# Patient Record
Sex: FEMALE | Race: WHITE | HISPANIC OR LATINO | Employment: FULL TIME | URBAN - METROPOLITAN AREA
[De-identification: names, ages, dates, MRNs, and addresses within clinical notes are randomized per-mention and may not be internally consistent; named-entity substitution may affect disease eponyms.]

---

## 2017-08-11 ENCOUNTER — HOSPITAL ENCOUNTER (EMERGENCY)
Facility: HOSPITAL | Age: 37
Discharge: HOME | End: 2017-08-11
Attending: EMERGENCY MEDICINE
Payer: COMMERCIAL

## 2017-08-11 VITALS
BODY MASS INDEX: 27.6 KG/M2 | HEIGHT: 62 IN | DIASTOLIC BLOOD PRESSURE: 77 MMHG | HEART RATE: 69 BPM | SYSTOLIC BLOOD PRESSURE: 115 MMHG | OXYGEN SATURATION: 98 % | WEIGHT: 150 LBS | TEMPERATURE: 98 F | RESPIRATION RATE: 15 BRPM

## 2017-08-11 DIAGNOSIS — T14.90XA INJURY: ICD-10-CM

## 2017-08-11 DIAGNOSIS — M62.838 MUSCLE SPASM: ICD-10-CM
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
## 2017-08-11 LAB
B-HCG UR QL: NEGATIVE
CTP QC/QA: YES

## 2017-08-11 PROCEDURE — 25000003 PHARM REV CODE 250: Performed by: EMERGENCY MEDICINE

## 2017-08-11 PROCEDURE — 81025 URINE PREGNANCY TEST: CPT | Performed by: EMERGENCY MEDICINE

## 2017-08-11 PROCEDURE — 99284 EMERGENCY DEPT VISIT MOD MDM: CPT | Mod: 25

## 2017-08-11 RX ORDER — CYCLOBENZAPRINE HCL 10 MG
5 TABLET ORAL 3 TIMES DAILY PRN
Qty: 15 TABLET | Refills: 0 | Status: SHIPPED | OUTPATIENT
Start: 2017-08-11 | End: 2017-08-16

## 2017-08-11 RX ORDER — IBUPROFEN 600 MG/1
600 TABLET ORAL
Status: COMPLETED | OUTPATIENT
Start: 2017-08-11 | End: 2017-08-11

## 2017-08-11 RX ORDER — NAPROXEN 500 MG/1
500 TABLET ORAL 2 TIMES DAILY WITH MEALS
Qty: 14 TABLET | Refills: 0 | Status: SHIPPED | OUTPATIENT
Start: 2017-08-11 | End: 2023-03-03 | Stop reason: SDUPTHER

## 2017-08-11 RX ADMIN — IBUPROFEN 600 MG: 600 TABLET, FILM COATED ORAL at 10:08
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
## 2023-03-03 ENCOUNTER — HOSPITAL ENCOUNTER (EMERGENCY)
Facility: HOSPITAL | Age: 43
Discharge: HOME | End: 2023-03-03
Attending: EMERGENCY MEDICINE

## 2023-03-03 VITALS
DIASTOLIC BLOOD PRESSURE: 56 MMHG | OXYGEN SATURATION: 100 % | SYSTOLIC BLOOD PRESSURE: 118 MMHG | TEMPERATURE: 98 F | HEART RATE: 85 BPM | RESPIRATION RATE: 16 BRPM

## 2023-03-03 DIAGNOSIS — D23.5 DERMOID CYST OF SKIN OF BACK: Primary | ICD-10-CM

## 2023-03-03 PROCEDURE — 99283 EMERGENCY DEPT VISIT LOW MDM: CPT

## 2023-03-03 RX ORDER — NAPROXEN 500 MG/1
500 TABLET ORAL 2 TIMES DAILY PRN
Qty: 20 TABLET | Refills: 0 | Status: SHIPPED | OUTPATIENT
Start: 2023-03-03

## 2023-03-03 RX ORDER — SULFAMETHOXAZOLE AND TRIMETHOPRIM 800; 160 MG/1; MG/1
1 TABLET ORAL 2 TIMES DAILY
Qty: 14 TABLET | Refills: 0 | Status: SHIPPED | OUTPATIENT
Start: 2023-03-03 | End: 2023-03-10

## 2023-03-03 NOTE — ED PROVIDER NOTES
Encounter Date: 3/3/2023    SCRIBE #1 NOTE: I, David Baldwin, am scribing for, and in the presence of,  Pablo Comer MD. I have scribed the following portions of the note - Other sections scribed: HPI, ROS, Physical Exam.     History     Chief Complaint   Patient presents with    Abscess     Noted to middle upper back x 4 days      This is a 42 y.o. female who has no past medical history on file.     The patient presents to the Emergency Department with an abscess.   Patient reports an abscess to the mid back that she noticed a few days ago and has since grown in size.  Pt reports the area is painful to touch.    The history is provided by the patient. The history is limited by a language barrier. A  was used.       Review of patient's allergies indicates:  No Known Allergies  No past medical history on file.  No past surgical history on file.  No family history on file.  Social History     Tobacco Use    Smoking status: Never    Smokeless tobacco: Never     Review of Systems   Skin:         +abscess     Physical Exam     Initial Vitals [03/03/23 0710]   BP Pulse Resp Temp SpO2   (!) 118/56 85 16 98 °F (36.7 °C) 100 %      MAP       --         Physical Exam    Nursing note and vitals reviewed.  Constitutional: She appears well-developed and well-nourished. She is not diaphoretic. No distress.   HENT:   Head: Normocephalic and atraumatic.   Mouth/Throat: Oropharynx is clear and moist.   Eyes: Conjunctivae are normal.   Cardiovascular:  Normal rate, regular rhythm and intact distal pulses.           Pulmonary/Chest: No respiratory distress.   Musculoskeletal:         General: Normal range of motion.     Neurological: She is alert and oriented to person, place, and time.   Skin: Skin is warm and dry. Capillary refill takes less than 2 seconds. No rash noted. No erythema.   3 cm cyst to the mid thoracic back without overlying erythema. It appears fluid filled and fluctuant.  No induration or  purulence.  Cyst is tender to palpation.   Psychiatric: She has a normal mood and affect.       ED Course   Procedures  Labs Reviewed - No data to display       Imaging Results    None          Medications - No data to display           Scribe Attestation:   Scribe #1: I performed the above scribed service and the documentation accurately describes the services I performed. I attest to the accuracy of the note.      ED Course as of 03/03/23 1143   Fri Mar 03, 2023   0739 I, Dr. Pablo Comer, personally performed the services described in this documentation.   All medical record entries made by the scribe were at my direction and in my presence.   I have reviewed the chart and agree that the record is accurate and complete.   Pablo Comer MD.    [NP]   6406 This is an emergent evaluation of a 42 y.o.female patient with presentation of cyst on her back, painful.  Per my exam, there is no overlying erythema 7 infection at this time.  This time.  Will treat conservatively, place patient on NSAIDs, apply ice as needed, wrote for antibiotics should patient's cyst start to appear infected.  Patient referred to general surgery for consult for possible cyst removal.  Given return precautions for worsening symptoms requiring possible I&D in the ED. Pt expressed understanding. [NP]      ED Course User Index  [NP] Pablo Comer MD                 Clinical Impression:   Final diagnoses:  [D23.5] Dermoid cyst of skin of back (Primary)        ED Disposition Condition    Discharge Stable          ED Prescriptions    None       Follow-up Information    None          Pablo Comer MD  03/03/23 0021

## 2023-03-03 NOTE — DISCHARGE INSTRUCTIONS
aplique hielo en el área para mayor comodidad.    comience a irving antibióticos si el quiste comienza a ponerse cedeno, caliente y se vuelve aún más doloroso.    lynda un seguimiento con el cirujano según lo referido, ya que pueden realizar shannon cirugía para extirpar el quiste.

## 2023-03-07 ENCOUNTER — OFFICE VISIT (OUTPATIENT)
Dept: SURGERY | Facility: CLINIC | Age: 43
End: 2023-03-07

## 2023-03-07 VITALS
SYSTOLIC BLOOD PRESSURE: 118 MMHG | WEIGHT: 169 LBS | DIASTOLIC BLOOD PRESSURE: 56 MMHG | HEART RATE: 85 BPM | BODY MASS INDEX: 31.1 KG/M2 | HEIGHT: 62 IN

## 2023-03-07 DIAGNOSIS — D23.5 DERMOID CYST OF SKIN OF BACK: ICD-10-CM

## 2023-03-07 PROCEDURE — 99213 OFFICE O/P EST LOW 20 MIN: CPT | Mod: PBBFAC,PO | Performed by: SURGERY

## 2023-03-07 PROCEDURE — 99243 OFF/OP CNSLTJ NEW/EST LOW 30: CPT | Mod: S$PBB,25,, | Performed by: SURGERY

## 2023-03-07 PROCEDURE — 99999 PR PBB SHADOW E&M-EST. PATIENT-LVL III: CPT | Mod: PBBFAC,,, | Performed by: SURGERY

## 2023-03-07 PROCEDURE — 10061 I&D ABSCESS COMP/MULTIPLE: CPT | Mod: S$PBB,,, | Performed by: SURGERY

## 2023-03-07 PROCEDURE — 10061 PR DRAIN SKIN ABSCESS COMPLIC: ICD-10-PCS | Mod: S$PBB,,, | Performed by: SURGERY

## 2023-03-07 PROCEDURE — 99999 PR PBB SHADOW E&M-EST. PATIENT-LVL III: ICD-10-PCS | Mod: PBBFAC,,, | Performed by: SURGERY

## 2023-03-07 PROCEDURE — 99243 PR OFFICE CONSULTATION,LEVEL III: ICD-10-PCS | Mod: S$PBB,25,, | Performed by: SURGERY

## 2023-03-07 PROCEDURE — 10061 I&D ABSCESS COMP/MULTIPLE: CPT | Mod: PBBFAC,PO | Performed by: SURGERY

## 2023-03-07 NOTE — PROGRESS NOTES
[facility]  OUTPATIENT H&P    REASON FOR VISIT/CC: Back cyst    HPI: Aarbella Moss is a 42 y.o. female with recent ER visit for back cyst.  F/U given to our office.  Pt has noted increased pain at site.    I have reviewed the patient's chart including prior progress notes, procedures and testing.     ROS:   Review of Systems   All other systems reviewed and are negative.    PROBLEM LIST:  There is no problem list on file for this patient.        HISTORY  History reviewed. No pertinent past medical history.    History reviewed. No pertinent surgical history.    Social History     Tobacco Use    Smoking status: Never    Smokeless tobacco: Never       History reviewed. No pertinent family history.      MEDS:  Current Outpatient Medications on File Prior to Visit   Medication Sig Dispense Refill    naproxen (NAPROSYN) 500 MG tablet Take 1 tablet (500 mg total) by mouth 2 (two) times daily as needed (pain). 20 tablet 0    sulfamethoxazole-trimethoprim 800-160mg (BACTRIM DS) 800-160 mg Tab Take 1 tablet by mouth 2 (two) times daily. for 7 days 14 tablet 0     No current facility-administered medications on file prior to visit.       ALLERGIES:  Review of patient's allergies indicates:  No Known Allergies      VITALS:  Vitals:    03/07/23 1320   BP: (!) 118/56   Pulse: 85         PHYSICAL EXAM:  Physical Exam  Constitutional:       Appearance: Normal appearance.   Pulmonary:      Effort: Pulmonary effort is normal.   Skin:     Comments: 3 cm back inclusion cyst   Psychiatric:         Mood and Affect: Mood normal.         Behavior: Behavior normal.         LABS:  No results found for: WBC, RBC, HGB, HCT, PLT  No results found for: GLU, NA, K, CL, CO2, BUN, CREATININE, CALCIUM  No results found for: ALT, AST, GGT, ALKPHOS, BILITOT  No results found for: MG, PHOS    STUDIES:  N/A images and reports were personally reviewed.        ASSESSMENT & PLAN:  42 y.o. female with suspected infected sebaceous cyst.   Will I and D in office.  Risks, benefits, and alternatives to the procedure were explained to the patient in detail.  All questions were answered and the patient has requested the procedure be done.  Informed consent was obtained.        Issa Talbert M.D., F.A.C.S.  [facility] and General Surgery  [facility]

## 2023-03-07 NOTE — PROGRESS NOTES
PATIENT: Arabella Moss    MRN: [id number]    DATE OF PROCEDURE: 03/07/2023    PREOPERATIVE DIAGNOSIS: Abscess of inclusion cyst of back    POSTOPERATIVE DIAGNOSIS: same    PROCEDURE: Complex Incision and Drainage of Infected Inclusion Cyst    SURGEON: Issa Talbert M.D.    ANESTHESIA: Local    ESTIMATED BLOOD LOSS: minimal    SPECIMEN: none    COMPLICATIONS: None    PROCEDURE IN DETAIL:  After procedural consent was obtained, the area was prepped and draped in a standard sterile fashion.  Local anesthetic was administered.  An incision was made through the skin and into the abscess cavity.  Purulent fluid was expressed along with cyst contents and the cavity was irrigated out with sterile saline after using blunt dissection to break up loculations and remove some of the cyst wall.  The abscess cavity was not packed.  A sterile dressing was applied.  The patient tolerated the procedure without difficulty or complication.      Issa Talbert M.D., F.A.C.S.  [facility] and General Surgery  [facility]

## 2023-08-27 ENCOUNTER — HOSPITAL ENCOUNTER (EMERGENCY)
Facility: HOSPITAL | Age: 43
Discharge: HOME | End: 2023-08-27
Attending: STUDENT IN AN ORGANIZED HEALTH CARE EDUCATION/TRAINING PROGRAM

## 2023-08-27 VITALS
TEMPERATURE: 98 F | RESPIRATION RATE: 18 BRPM | SYSTOLIC BLOOD PRESSURE: 119 MMHG | HEART RATE: 89 BPM | OXYGEN SATURATION: 100 % | DIASTOLIC BLOOD PRESSURE: 78 MMHG

## 2023-08-27 DIAGNOSIS — G44.209 TENSION HEADACHE: Primary | ICD-10-CM

## 2023-08-27 LAB
ALBUMIN SERPL BCP-MCNC: 4 G/DL (ref 3.5–5.2)
ALP SERPL-CCNC: 87 U/L (ref 55–135)
ALT SERPL W/O P-5'-P-CCNC: 23 U/L (ref 10–44)
ANION GAP SERPL CALC-SCNC: 10 MMOL/L (ref 8–16)
AST SERPL-CCNC: 23 U/L (ref 10–40)
B-HCG UR QL: NEGATIVE
BASOPHILS # BLD AUTO: 0.04 K/UL (ref 0–0.2)
BASOPHILS NFR BLD: 0.6 % (ref 0–1.9)
BILIRUB SERPL-MCNC: 0.3 MG/DL (ref 0.1–1)
BILIRUB UR QL STRIP: NEGATIVE
BUN SERPL-MCNC: 10 MG/DL (ref 6–20)
CALCIUM SERPL-MCNC: 8.9 MG/DL (ref 8.7–10.5)
CHLORIDE SERPL-SCNC: 109 MMOL/L (ref 95–110)
CLARITY UR: CLEAR
CO2 SERPL-SCNC: 20 MMOL/L (ref 23–29)
COLOR UR: COLORLESS
CREAT SERPL-MCNC: 0.7 MG/DL (ref 0.5–1.4)
CTP QC/QA: YES
CTP QC/QA: YES
DIFFERENTIAL METHOD: NORMAL
EOSINOPHIL # BLD AUTO: 0.3 K/UL (ref 0–0.5)
EOSINOPHIL NFR BLD: 4.2 % (ref 0–8)
ERYTHROCYTE [DISTWIDTH] IN BLOOD BY AUTOMATED COUNT: 12.4 % (ref 11.5–14.5)
EST. GFR  (NO RACE VARIABLE): >60 ML/MIN/1.73 M^2
GLUCOSE SERPL-MCNC: 99 MG/DL (ref 70–110)
GLUCOSE UR QL STRIP: NEGATIVE
HCT VFR BLD AUTO: 40.7 % (ref 37–48.5)
HGB BLD-MCNC: 14.2 G/DL (ref 12–16)
HGB UR QL STRIP: ABNORMAL
IMM GRANULOCYTES # BLD AUTO: 0.01 K/UL (ref 0–0.04)
IMM GRANULOCYTES NFR BLD AUTO: 0.1 % (ref 0–0.5)
KETONES UR QL STRIP: NEGATIVE
LEUKOCYTE ESTERASE UR QL STRIP: NEGATIVE
LYMPHOCYTES # BLD AUTO: 2 K/UL (ref 1–4.8)
LYMPHOCYTES NFR BLD: 29.3 % (ref 18–48)
MAGNESIUM SERPL-MCNC: 2.2 MG/DL (ref 1.6–2.6)
MCH RBC QN AUTO: 30 PG (ref 27–31)
MCHC RBC AUTO-ENTMCNC: 34.9 G/DL (ref 32–36)
MCV RBC AUTO: 86 FL (ref 82–98)
MICROSCOPIC COMMENT: NORMAL
MONOCYTES # BLD AUTO: 0.5 K/UL (ref 0.3–1)
MONOCYTES NFR BLD: 7.6 % (ref 4–15)
NEUTROPHILS # BLD AUTO: 3.9 K/UL (ref 1.8–7.7)
NEUTROPHILS NFR BLD: 58.2 % (ref 38–73)
NITRITE UR QL STRIP: NEGATIVE
NRBC BLD-RTO: 0 /100 WBC
PH UR STRIP: 6 [PH] (ref 5–8)
PLATELET # BLD AUTO: 237 K/UL (ref 150–450)
PMV BLD AUTO: 10.7 FL (ref 9.2–12.9)
POTASSIUM SERPL-SCNC: 3.9 MMOL/L (ref 3.5–5.1)
PROT SERPL-MCNC: 7.7 G/DL (ref 6–8.4)
PROT UR QL STRIP: NEGATIVE
RBC # BLD AUTO: 4.73 M/UL (ref 4–5.4)
RBC #/AREA URNS HPF: 0 /HPF (ref 0–4)
SARS-COV-2 RDRP RESP QL NAA+PROBE: NEGATIVE
SODIUM SERPL-SCNC: 139 MMOL/L (ref 136–145)
SP GR UR STRIP: 1.01 (ref 1–1.03)
SQUAMOUS #/AREA URNS HPF: 4 /HPF
URN SPEC COLLECT METH UR: ABNORMAL
UROBILINOGEN UR STRIP-ACNC: NEGATIVE EU/DL
WBC # BLD AUTO: 6.68 K/UL (ref 3.9–12.7)
WBC #/AREA URNS HPF: 4 /HPF (ref 0–5)

## 2023-08-27 PROCEDURE — 81025 URINE PREGNANCY TEST: CPT | Performed by: STUDENT IN AN ORGANIZED HEALTH CARE EDUCATION/TRAINING PROGRAM

## 2023-08-27 PROCEDURE — 85025 COMPLETE CBC W/AUTO DIFF WBC: CPT | Performed by: STUDENT IN AN ORGANIZED HEALTH CARE EDUCATION/TRAINING PROGRAM

## 2023-08-27 PROCEDURE — 25000003 PHARM REV CODE 250: Performed by: STUDENT IN AN ORGANIZED HEALTH CARE EDUCATION/TRAINING PROGRAM

## 2023-08-27 PROCEDURE — 87635 SARS-COV-2 COVID-19 AMP PRB: CPT | Performed by: STUDENT IN AN ORGANIZED HEALTH CARE EDUCATION/TRAINING PROGRAM

## 2023-08-27 PROCEDURE — 83735 ASSAY OF MAGNESIUM: CPT | Performed by: STUDENT IN AN ORGANIZED HEALTH CARE EDUCATION/TRAINING PROGRAM

## 2023-08-27 PROCEDURE — 63600175 PHARM REV CODE 636 W HCPCS: Performed by: STUDENT IN AN ORGANIZED HEALTH CARE EDUCATION/TRAINING PROGRAM

## 2023-08-27 PROCEDURE — 81000 URINALYSIS NONAUTO W/SCOPE: CPT | Performed by: STUDENT IN AN ORGANIZED HEALTH CARE EDUCATION/TRAINING PROGRAM

## 2023-08-27 PROCEDURE — 96361 HYDRATE IV INFUSION ADD-ON: CPT

## 2023-08-27 PROCEDURE — 80053 COMPREHEN METABOLIC PANEL: CPT | Performed by: STUDENT IN AN ORGANIZED HEALTH CARE EDUCATION/TRAINING PROGRAM

## 2023-08-27 PROCEDURE — 99285 EMERGENCY DEPT VISIT HI MDM: CPT | Mod: 25

## 2023-08-27 PROCEDURE — 96374 THER/PROPH/DIAG INJ IV PUSH: CPT

## 2023-08-27 RX ORDER — DIPHENHYDRAMINE HCL 25 MG
25 CAPSULE ORAL
Status: COMPLETED | OUTPATIENT
Start: 2023-08-27 | End: 2023-08-27

## 2023-08-27 RX ORDER — PROCHLORPERAZINE EDISYLATE 5 MG/ML
10 INJECTION INTRAMUSCULAR; INTRAVENOUS
Status: COMPLETED | OUTPATIENT
Start: 2023-08-27 | End: 2023-08-27

## 2023-08-27 RX ORDER — BUTALBITAL, ACETAMINOPHEN AND CAFFEINE 50; 325; 40 MG/1; MG/1; MG/1
1 TABLET ORAL
Status: COMPLETED | OUTPATIENT
Start: 2023-08-27 | End: 2023-08-27

## 2023-08-27 RX ORDER — BUTALBITAL, ACETAMINOPHEN AND CAFFEINE 50; 325; 40 MG/1; MG/1; MG/1
1 TABLET ORAL EVERY 4 HOURS PRN
Qty: 30 TABLET | Refills: 0 | Status: SHIPPED | OUTPATIENT
Start: 2023-08-27

## 2023-08-27 RX ADMIN — DIPHENHYDRAMINE HYDROCHLORIDE 25 MG: 25 CAPSULE ORAL at 03:08

## 2023-08-27 RX ADMIN — BUTALBITAL, ACETAMINOPHEN, AND CAFFEINE 1 TABLET: 50; 325; 40 TABLET ORAL at 03:08

## 2023-08-27 RX ADMIN — SODIUM CHLORIDE 1000 ML: 9 INJECTION, SOLUTION INTRAVENOUS at 03:08

## 2023-08-27 RX ADMIN — PROCHLORPERAZINE EDISYLATE 10 MG: 5 INJECTION INTRAMUSCULAR; INTRAVENOUS at 03:08

## 2023-08-27 NOTE — DISCHARGE INSTRUCTIONS
Thank you for coming to our Emergency Department today. It is important to remember that some problems are difficult to diagnose and may not be found during your first visit. Be sure to follow up with your primary care doctor and review any labs/imaging that was performed with them. If you do not have a primary care doctor, you may contact the one listed on your discharge paperwork or you may also call the [facility] Appointment Desk at [phone] to schedule an appointment with one.     All medications may potentially have side effects and it is impossible to predict which medications may give you side effects. If you feel that you are having a negative effect of any medication you should immediately stop taking them and seek medical attention.    Return to the ER with any questions/concerns, new/concerning symptoms, worsening or failure to improve. Do not drive or make any important decisions for 24 hours if you have received any pain medications, sedatives or mood altering drugs during your ER visit.

## 2023-08-27 NOTE — ED PROVIDER NOTES
Encounter Date: 8/27/2023       History     Chief Complaint   Patient presents with    Headache     Caden Hodge [id number]      Headache that started around 6 pm yesterday. Has been constant. Radiates down back of neck. Denies N/V. +photophobia  Tylenol taken last yesterday.      42 y.o. female who  has no past medical history presents to emergency department with headache for the past 2 days.  Patient reports symptoms began 2 days ago and has been progressively worsening.  She reports pain starts in bilateral head and radiates down her neck.  She describes the pain as squeezing in nature.  She reports having mild photophobia but denies any phonophobia.  Denies any fevers or chills.  She took Tylenol yesterday with minimal relief.  Furthermore she reports having numbness to the left side of her face.  She states when she was in her teenage years and [facility] she is to have similar headaches with numbness in the same region.  Denies any gait instability.  Denies any focal weakness.  Denies any recent trauma.      The history is provided by the patient. The history is limited by a language barrier. A  was used ([id number]).     Review of patient's allergies indicates:  No Known Allergies  No past medical history on file.  No past surgical history on file.  No family history on file.  Social History     Tobacco Use    Smoking status: Never    Smokeless tobacco: Never     Review of Systems   Constitutional:  Negative for chills and fever.   HENT:  Negative for congestion and rhinorrhea.    Eyes:  Positive for photophobia. Negative for pain and visual disturbance.   Respiratory:  Negative for cough and shortness of breath.    Cardiovascular:  Negative for chest pain and leg swelling.   Gastrointestinal:  Negative for abdominal pain, nausea and vomiting.   Endocrine: Negative for polyuria.   Genitourinary:  Negative for dysuria and hematuria.   Musculoskeletal:  Negative for gait problem and neck pain.    Skin:  Negative for rash.   Allergic/Immunologic: Negative for immunocompromised state.   Neurological:  Positive for numbness. Negative for weakness and headaches.       Physical Exam     Initial Vitals [08/27/23 0248]   BP Pulse Resp Temp SpO2   (!) 143/80 89 18 98.4 °F (36.9 °C) 100 %      MAP       --         Physical Exam    Nursing note and vitals reviewed.  Constitutional: She is not diaphoretic. No distress.   HENT:   Head: Normocephalic and atraumatic.   Eyes: Conjunctivae and EOM are normal. Pupils are equal, round, and reactive to light.   Neck: No Brudzinski's sign and no Kernig's sign noted.   Normal range of motion.  Cardiovascular:  Regular rhythm.           Pulmonary/Chest: Breath sounds normal. No respiratory distress.   Abdominal: Abdomen is soft. Bowel sounds are normal. She exhibits no distension. There is no abdominal tenderness. There is no rebound and no guarding.   Musculoskeletal:         General: No tenderness. Normal range of motion.      Cervical back: Normal range of motion.     Lymphadenopathy:     She has no cervical adenopathy.   Neurological: She is alert.   Skin: Skin is warm. Capillary refill takes less than 2 seconds.   Psychiatric: She has a normal mood and affect. Her behavior is normal.         ED Course   Procedures  Labs Reviewed   COMPREHENSIVE METABOLIC PANEL - Abnormal; Notable for the following components:       Result Value    CO2 20 (*)     All other components within normal limits   URINALYSIS, REFLEX TO URINE CULTURE - Abnormal; Notable for the following components:    Color, UA Colorless (*)     Occult Blood UA 1+ (*)     All other components within normal limits    Narrative:     Specimen Source->Urine   CBC W/ AUTO DIFFERENTIAL   MAGNESIUM   URINALYSIS MICROSCOPIC    Narrative:     Specimen Source->Urine   POCT URINE PREGNANCY   SARS-COV-2 RDRP GENE          Imaging Results              CT Head Without Contrast (Final result)  Result time 08/27/23 04:16:41       Final result by Lew Lewis MD (08/27/23 04:16:41)                   Impression:      There is no evidence for acute intracranial process.    Paranasal sinus disease as above.      Electronically signed by: Lew Lewis  Date:    08/27/2023  Time:    04:16               Narrative:    EXAMINATION:  CT HEAD WITHOUT CONTRAST    CLINICAL HISTORY:  Headache, sudden, severe;    TECHNIQUE:  Low dose axial images were obtained through the head.  Coronal and sagittal reformations were also performed. Contrast was not administered.    COMPARISON:  None.    FINDINGS:  The ventricular system and sulcal pattern appear appropriate.  Mild chronic appearing white matter change noted.  There is no evidence for intracranial mass, mass effect or midline shift.  There is no evidence for acute intracranial hemorrhage.    The visualized orbits appear intact.  The mastoid air cells appear well aerated.  There is a small air-fluid level of the left sphenoid sinus, appearing viscus.  There is minimal paranasal sinus mucosal thickening otherwise noted.  The osseous structures appear intact.                                       Medications   butalbital-acetaminophen-caffeine -40 mg per tablet 1 tablet (1 tablet Oral Given 8/27/23 0350)   sodium chloride 0.9% bolus 1,000 mL 1,000 mL (0 mLs Intravenous Stopped 8/27/23 1398)   prochlorperazine injection Soln 10 mg (10 mg Intravenous Given 8/27/23 0351)   diphenhydrAMINE capsule 25 mg (25 mg Oral Given 8/27/23 0351)     Medical Decision Making    Initial Assessment:    42 y.o. female who  has no past medical history presents to emergency department with 2 days of headache.  Patient in no acute distress decreased sensation to touch in the left upper lower face no other focal neurological deficits.  No meningeal signs on exam.  Vitals within normal limits.    Vitals signs and physical exam as above.       Differential diagnosis includes migraine versus tension type headache. No  headache red flags. Neurologic exam without evidence of meningismus, focal neurologic findings. Presentation not consistent with acute intracranial bleed to include SAH (lack of risk factors, headache history). Presentation not consistent with acute CNS infection to include meningitis or brain abscess, Temporal arteritis unlikely, as is acute angle closure glaucoma given history and physical findings. Presentation not consistent with other acute, emergent causes of headache at this time.     Interventions and Plan:    Labs: CMP CBC mg  Imaging: CT head  Medications: IVF, migraine cocktail         Amount and/or Complexity of Data Reviewed  Labs: ordered.  Radiology: ordered. Decision-making details documented in ED Course.    Risk  OTC drugs.  Prescription drug management.               ED Course as of 08/27/23 1720   Sun Aug 27, 2023   0424 CT Head Without Contrast  No acute intracranial process  [AS]   0425 CBC without significant leukocytosis, anemia (at baseline), or platelet abnormalities.  Chem 14 negative for hypo-or hyper natremia, kalemia , chloridemia, or other electrolyte abnormalities; BUN and creatinine (at baseline), ALT and AST were within normal limits indicating normal liver function.   [AS]   0437 Pt is currently stable for discharge.   I see no indication of an emergent process beyond that addressed during our encounter but have duly counseled the patient/family regarding the need for prompt follow-up as well as the indications that should prompt immediate return to the emergency room should new or worrisome developments occur. I discussed the ED work up and diagnostic findings with the patient. The patient/family has been provided with verbal and printed direction regarding our final diagnosis(es) as well as instructions regarding use of OTC and/or Rx medications intended to manage the patient's aforementioned conditions. The patient/family verbalized an understanding. The patient/family is asked  if there are any questions or concerns. We discuss the case, until all issues are addressed to the patient/family's satisfaction. Patient/family understands and is agreeable to the plan.  [AS]      ED Course User Index  [AS] Adeline Castle MD                  DISCLAIMER: This note was prepared with [employer] voice recognition transcription software. Garbled syntax, mangled pronouns, and other bizarre constructions may be attributed to that software system.   Clinical Impression:   Final diagnoses:  [G44.209] Tension headache (Primary)        ED Disposition Condition    Discharge Stable          ED Prescriptions       Medication Sig Dispense Start Date End Date Auth. Provider    butalbital-acetaminophen-caffeine -40 mg (FIORICET, ESGIC) -40 mg per tablet Take 1 tablet by mouth every 4 (four) hours as needed for Pain or Headaches. 30 tablet 8/27/2023 -- Adeline Castle MD          Follow-up Information       Follow up With Specialties Details Why Contact [facility] - Emergency Dept Emergency Medicine  If symptoms worsen [address] [address] [phone]    primary care doctor  Schedule an appointment as soon as possible for a visit  for reassesment              Adeline Castle MD  08/27/23 2480

## 2024-09-27 ENCOUNTER — HOSPITAL ENCOUNTER (EMERGENCY)
Facility: HOSPITAL | Age: 44
Discharge: HOME | End: 2024-09-27
Attending: EMERGENCY MEDICINE

## 2024-09-27 VITALS
BODY MASS INDEX: 29.69 KG/M2 | WEIGHT: 184.75 LBS | TEMPERATURE: 98 F | OXYGEN SATURATION: 98 % | HEIGHT: 66 IN | SYSTOLIC BLOOD PRESSURE: 119 MMHG | DIASTOLIC BLOOD PRESSURE: 72 MMHG | RESPIRATION RATE: 18 BRPM | HEART RATE: 96 BPM

## 2024-09-27 DIAGNOSIS — L29.9 ITCHING: ICD-10-CM

## 2024-09-27 DIAGNOSIS — G44.209 TENSION HEADACHE: Primary | ICD-10-CM

## 2024-09-27 LAB
B-HCG UR QL: NEGATIVE
CTP QC/QA: YES
INFLUENZA A, MOLECULAR: NEGATIVE
INFLUENZA B, MOLECULAR: NEGATIVE
SARS-COV-2 RDRP RESP QL NAA+PROBE: NEGATIVE
SPECIMEN SOURCE: NORMAL

## 2024-09-27 PROCEDURE — 87502 INFLUENZA DNA AMP PROBE: CPT

## 2024-09-27 PROCEDURE — 25000003 PHARM REV CODE 250

## 2024-09-27 PROCEDURE — 81025 URINE PREGNANCY TEST: CPT

## 2024-09-27 PROCEDURE — 99283 EMERGENCY DEPT VISIT LOW MDM: CPT

## 2024-09-27 PROCEDURE — U0002 COVID-19 LAB TEST NON-CDC: HCPCS

## 2024-09-27 RX ORDER — BUTALBITAL, ACETAMINOPHEN AND CAFFEINE 50; 325; 40 MG/1; MG/1; MG/1
1 TABLET ORAL
Status: COMPLETED | OUTPATIENT
Start: 2024-09-27 | End: 2024-09-27

## 2024-09-27 RX ORDER — CETIRIZINE HYDROCHLORIDE 10 MG/1
10 TABLET ORAL DAILY
Qty: 30 TABLET | Refills: 0 | Status: SHIPPED | OUTPATIENT
Start: 2024-09-27 | End: 2025-09-27

## 2024-09-27 RX ORDER — DIPHENHYDRAMINE HCL 25 MG
25 CAPSULE ORAL EVERY 6 HOURS PRN
Qty: 20 CAPSULE | Refills: 0 | Status: SHIPPED | OUTPATIENT
Start: 2024-09-27

## 2024-09-27 RX ORDER — CETIRIZINE HYDROCHLORIDE 10 MG/1
10 TABLET ORAL
Status: COMPLETED | OUTPATIENT
Start: 2024-09-27 | End: 2024-09-27

## 2024-09-27 RX ADMIN — BUTALBITAL, ACETAMINOPHEN, AND CAFFEINE 1 TABLET: 325; 50; 40 TABLET ORAL at 10:09

## 2024-09-27 RX ADMIN — CETIRIZINE HYDROCHLORIDE 10 MG: 10 TABLET, FILM COATED ORAL at 10:09

## 2024-09-27 NOTE — ED NOTES
Pt reports to ED with c/o generalized body aches, itchiness, and headache x2 days. Patient was recently seen at the ED and prescribed vistaril and prednisone. Ambulatory with steady gait, NAD noted.      Adult Physical Assessment  LOC: 43 y.o. female verified via two identifiers.  The patient is awake, alert & oriented to person, place & time. No acute distress noted at this time, pt is speaking appropriately at this time.    APPEARANCE: Patient resting comfortably and appears to be in no acute distress at this time. Patient is clean and well groomed, patient's clothing is properly fastened.    SKIN:The skin is warm, dry & intact. Color consistent with ethnicity, patient has normal skin turgor and moist mucus membranes, skin intact, no breakdown or brusing noted. Patient complaining of itchiness.    MUSCULOSKELETAL: Patient moving all extremities well, no obvious swelling or deformities noted.    RESPIRATORY: Airway is open and patent, respirations are spontaneous, even, and non-labored patient has a normal effort and rate, no accessory muscle use noted.       CARDIAC: Patient has a normal rate and rhythm, no periphreal edema noted in any extremity, capillary refill < 3 seconds in all extremities.    ABDOMEN: Soft and non tender to palpation, no abdominal distention noted.     NEUROLOGIC: Sensation is intact. Eyes open spontaneously, behavior appropriate to situation, follows commands. Speech is clear and appropriate. Facial expression symmetrical, bilateral hand grasp equal and even. No bilateral lower extremity edema.

## 2024-09-27 NOTE — ED PROVIDER NOTES
"Encounter Date: 9/27/2024       History     Chief Complaint   Patient presents with    Multiple complaints     Patient presents to the ED complaining of generalized body aches, itchiness, and headache x2 days. Patient was seen at the ED and prescribed prednisone and vistaril. Patient ambulatory to triage, NAD noted.     43-year-old female with past medical history of GERD presents to the ED with multiple complaints.  Patient notes generalized body aches, intermittent headaches x 2 days.  Patient denies vision changes, neck rigidity or irritation.  Has been using Tylenol with minimal improvement of her symptoms.  Denies worst headache of her life. Of note, patient was seen in the ED for " rash". Upon chart review, no notes available regarding that visit. She was sent home with prednisone and vistaril. States she is still itchy, describes it as generalized. Has been using Rx with some relief of her symptoms. Patient denies use of new medications, lotions, soaps, detergents.  Deny recent travels.  No fever, nausea, vomiting, chest pain, shortness of breath, abdominal pain.  No other acute complaints today.    The history is provided by the patient. No  was used.     Review of patient's allergies indicates:  No Known Allergies  No past medical history on file.  No past surgical history on file.  No family history on file.  Social History     Tobacco Use    Smoking status: Never    Smokeless tobacco: Never     Review of Systems   Constitutional:  Negative for chills and fever.   HENT:  Positive for congestion.    Eyes:  Negative for photophobia and visual disturbance.   Respiratory:  Negative for shortness of breath.    Cardiovascular:  Negative for chest pain.   Gastrointestinal:  Negative for abdominal distention, abdominal pain, rectal pain and vomiting.   Musculoskeletal:  Positive for myalgias.   Skin:  Negative for rash.   Neurological:  Positive for headaches.       Physical Exam     Initial " Vitals [09/27/24 1034]   BP Pulse Resp Temp SpO2   119/72 96 18 98.2 °F (36.8 °C) 98 %      MAP       --         Physical Exam    Vitals reviewed.  Constitutional: She appears well-developed and well-nourished. She is not diaphoretic. No distress.   HENT:   Head: Normocephalic and atraumatic.   Right Ear: External ear normal.   Left Ear: External ear normal.   Mouth/Throat: Oropharynx is clear and moist.   Eyes: EOM are normal.   Neck: Neck supple.   Normal range of motion.  Cardiovascular:  Normal rate and normal heart sounds.           Pulmonary/Chest: Breath sounds normal. No respiratory distress. She has no wheezes.   Abdominal: Abdomen is soft. Bowel sounds are normal. She exhibits no distension. There is no abdominal tenderness.   Musculoskeletal:         General: Normal range of motion.      Cervical back: Normal range of motion and neck supple.     Neurological: She is alert and oriented to person, place, and time. GCS score is 15. GCS eye subscore is 4. GCS verbal subscore is 5. GCS motor subscore is 6.   Skin: Skin is warm. Capillary refill takes less than 2 seconds.   On exam, I do not appreciate visible rash. No evidence or urticaria, lesions.    Psychiatric: She has a normal mood and affect. Her behavior is normal. Judgment and thought content normal.         ED Course   Procedures  Labs Reviewed   INFLUENZA A & B BY MOLECULAR       Result Value    Influenza A, Molecular Negative      Influenza B, Molecular Negative      Flu A & B Source Nasal swab     SARS-COV-2 RNA AMPLIFICATION, QUAL    SARS-CoV-2 RNA, Amplification, Qual Negative     POCT URINE PREGNANCY    POC Preg Test, Ur Negative       Acceptable Yes            Imaging Results    None          Medications   butalbital-acetaminophen-caffeine -40 mg per tablet 1 tablet (1 tablet Oral Given 9/27/24 1051)   cetirizine tablet 10 mg (10 mg Oral Given 9/27/24 1052)     Medical Decision Making  Differential Diagnosis includes, but  is not limited to:    Necrotizing fasciitis, erythema multiforme, Darnell-Alex syndrome, toxic epidermal necrolysis, DIC, cellulitis, Staph scalded skin syndrome, toxic shock syndrome, secondary syphilis, abscess, osteomyelitis, septic joint, MRSA, DVT, superficial thrombophlebitis, varicose vein, drug eruption, allergic reaction/urticatia, irritant/contact dermatitis, viral exanthem, local trauma/contusion, abrasion.    Differential Diagnosis includes, but is not limited to:  Ischemic stroke, hemorrhagic stroke, subarachnoid hemorrhage/ruptured aneurysm, intracranial lesion/mass, meningitis/encephalitis, epidural hematoma, subdural hematoma, pseudotumor cerebri, venous sinus thrombosis, CO poisoning, hypertensive encephalopathy, MI/ACS, head trauma/contusion, concussion, sinus headache, dehydration, anxiety, medication non-compliance, primary headache (tension/cluster/migraine).      ED management     43-year-old female with past medical history of GERD presents to the ED with multiple complaints. Patient is not toxic appearing, hemodynamically stable and resting comfortably on bed. Patient is well-appearing.  Awake and alert.  Afebrile with vitals WNL. No distress on exam.  Headache was/was not of gradual onset. No headache red flags.  Neurologic exam without evidence of meningismus, AMS, focal neurologic findings. After complete evaluation, including thorough history and physical exam, the patient's symptoms are most likely due to headache (including, but not limited to, tension/cluster/migraine headache). No abnormal symptoms for patient. The patient's headaches are similar in character to prior. The patient was treated with supportive care Fioricet  and improved.  Patient advised to return to ER if alteration in mental status, onset of fevers, visual changes, or peripheral weakness/numbness/tingling.     After complete evaluation, including thorough history and physical exam, the patient's symptoms are most  consistent with non-specific dermatitis. The exact etiology of the patient's rash is currently unknown, but appears to be benign at this time. There are no concerning features to suggest acute infection, cellulitis, abscess, or necrotizing fasciitis. There is no diffuse skin or mucous membrane involvement to suggest TEN/SJS. The appearance does not suggest scabies/bedbugs, or fungal infection. I will send Rx Benadryl and Zyrtec. Advised to monitor symptoms and continue use of Prednisone. Will place referral to [facility] family medicine for further care. At this time, I feel there is no emergent condition requiring admission or further testing. Findings and clinical impression discussed with patient.    I have discussed the specifics of the workup with the patient and the patient has verbalized understanding of the details of the workup, the diagnosis, the treatment plan, and the need for outpatient follow-up with PCP. ED precautions given. Discussed with pt about returning to the ED, if symptoms fail to improve or worsens    RESULTS:  Documented in ED course.   Labs/ekg interpreted by myself       Voice recognition software utilized in this note. Typographical and content errors may occur with this process. While efforts are made to detect and correct such errors, in some cases errors will persist. For this reason, wording in this document should be considered in the proper context and not strictly verbatim.                      Amount and/or Complexity of Data Reviewed  Labs: ordered. Decision-making details documented in ED Course.    Risk  OTC drugs.  Prescription drug management.               ED Course as of 09/27/24 1913   Fri Sep 27, 2024   1054 hCG Qualitative, Urine: Negative  Negative [NW]   1111 BP: 119/72 [NW]   1111 Temp: 98.2 °F (36.8 °C) [NW]   1111 Pulse: 96 [NW]   1111 Resp: 18 [NW]   1111 SpO2: 98 % [NW]   1135 Influenza A & B by Molecular  Negative [NW]   1141 SARS-CoV-2 RNA, Amplification, Qual:  Negative  NEGATIVE [NW]      ED Course User Index  [NW] Radha Doll PA-C                             Clinical Impression:  Final diagnoses:  [G44.209] Tension headache (Primary)  [L29.9] Itching          ED Disposition Condition    Discharge Stable          ED Prescriptions       Medication Sig Dispense Start Date End Date Auth. Provider    cetirizine (ZYRTEC) 10 MG tablet Take 1 tablet (10 mg total) by mouth once daily. 30 tablet 9/27/2024 9/27/2025 Radha Doll PA-C    diphenhydrAMINE (BENADRYL) 25 mg capsule Take 1 capsule (25 mg total) by mouth every 6 (six) hours as needed for Itching or Allergies. 20 capsule 9/27/2024 -- Radha Doll PA-C          Follow-up Information       Follow up With Specialties Details Why Contact Info Additional Information    [facility] Family Medicine Schedule an appointment as soon as possible for a visit in 3 days As needed, If symptoms worsen [address], [address]  [place] [address] [phone] Please park in [address] or [address] and use [address] entrance. Take Medical Office Bldg. elevators.             Radha Doll PA-C  09/27/24 1913

## 2024-09-27 NOTE — DISCHARGE INSTRUCTIONS
Sylvia. Leos,     Dania por dejarme cuidar de ti en el opal de hoy! Fue un placer conocerte, y espero que te sientas mejor pronto. Tambien aqui le dejo informaciones/ instrucciones sobre marc plan/ tratamiento médico:  -  -  -  -    Si desea acceder marc expediente médico y lo que se hizo danita, utilice la aplicación de [facility]. No dude en regresar si pastora síntomas empeoran o si desarrolla cualquier otro síntoma preocupante.     Nuestro objetivo en el departamento de emergencias es siempre brindarle shannon atención y un servicio excepcional. Es posible que reciba shannon encuesta por correo postal o electrónico en la  próxima semana con respecto a marc experiencia en nuestra aaron de emergencias. Le agradeceria mucho si completara la encuesta compartiendo marc experiencia con nosotros. Pastora comentarios nos proporcionan shannon manera de reconocer a nuestro personal que gabrielle muy buena atencion y ayuda a nuestros queridos pacientes, al igual nos ayuda aprender sd mejorar el cuidado y servicio que ofrecemos debajo de nuestra aspiración de excelencia.    Atentamente,    Radha Doll PA-C  [employer] [facility]  [facility], [place], and [facility]

## 2024-10-07 ENCOUNTER — OFFICE VISIT (OUTPATIENT)
Dept: FAMILY MEDICINE | Facility: HOSPITAL | Age: 44
End: 2024-10-07

## 2024-10-07 VITALS
WEIGHT: 182.75 LBS | BODY MASS INDEX: 29.37 KG/M2 | DIASTOLIC BLOOD PRESSURE: 77 MMHG | HEIGHT: 66 IN | OXYGEN SATURATION: 98 % | HEART RATE: 76 BPM | SYSTOLIC BLOOD PRESSURE: 112 MMHG

## 2024-10-07 DIAGNOSIS — L29.9 ITCHING: ICD-10-CM

## 2024-10-07 DIAGNOSIS — Z59.71 DOES NOT HAVE HEALTH INSURANCE: ICD-10-CM

## 2024-10-07 DIAGNOSIS — G44.209 TENSION HEADACHE: ICD-10-CM

## 2024-10-07 DIAGNOSIS — Z76.89 ENCOUNTER TO ESTABLISH CARE WITH NEW DOCTOR: Primary | ICD-10-CM

## 2024-10-07 PROCEDURE — 99213 OFFICE O/P EST LOW 20 MIN: CPT

## 2024-10-07 NOTE — PROGRESS NOTES
"  [facility] FAMILY MEDICINE CLINIC NOTE  New Patient Visit      SUBJECTIVE:     Patient: Arabella Moss is a 43 y.o. female.    Chief Complaint:   Chief Complaint   Patient presents with    Follow-up       History of Present Illness:  Patient presents to clinic as new patient here to establish care with PCP    Seen in ED on 09/27/2024 for tension headache, itching and discharged on Zyrtec 10 mg and Benadryl 25 mg  Endorses intermittent dizziness, spinning sensation, headache  Takes Tylenol PRN  Denies LOC    Endorses itchy skin and intermittent rash  Taking Zyrtec  Denies N/V, photosensitivity    Mammogram completed 01/08/2024    Patient on Nexplanon, desires tubal ligation    Social History:  Smoker - none  EtOH use - denies  Drug use - Patient denies illicit drug use   Sexual history - Sexually active with 1 partner ()    Lives with     Denies family history of automimmune conditions      Kate #[id number] utilized for this encounter      Review of patient's allergies indicates:  No Known Allergies    No past medical history on file.    Current Outpatient Medications   Medication Instructions    butalbital-acetaminophen-caffeine -40 mg (FIORICET, ESGIC) -40 mg per tablet 1 tablet, Oral, Every 4 hours PRN    cetirizine (ZYRTEC) 10 mg, Oral, Daily    diphenhydrAMINE (BENADRYL) 25 mg, Oral, Every 6 hours PRN    naproxen (NAPROSYN) 500 mg, Oral, 2 times daily PRN       No past surgical history on file.    No family history on file.    Social History     Tobacco Use    Smoking status: Never    Smokeless tobacco: Never          ROS  A 10+ review of systems was performed with pertinent positives and negatives noted above in the history of present illness. Other systems were negative unless otherwise specified.    OBJECTIVE:     Vital Signs (Most Recent)  Vitals:    10/07/24 0910   BP: 112/77   Pulse: 76   SpO2: 98%   Weight: 82.9 kg (182 lb 12.2 oz)   Height: 5' 6" (1.676 m) "     BMI: Body mass index is 29.5 kg/m².     Physical Exam:  Gen: No apparent distress, cooperative & interactive  CV: RRR, S1 and S2 present, no LE edema  Resp: CTAB, normal respiratory effort    ASSESSMENT:   Arabella Moss is a 43 y.o. female who presents to clinic to for    1. Encounter to establish care with new doctor    2. Tension headache    3. Itching    4. Does not have health insurance       PLAN:     1. Encounter to establish care with new doctor (Primary)  - deferred routine testing as patient does not have health insurance    2. Tension headache  - continue conservative management with analgesia PRN  - Ambulatory referral/consult to [facility]    3. Itching  - continue Zyrtec  - TATIANA sent to [facility]  - Ambulatory referral/consult to [facility]    4. Does not have health insurance  - resources provided for [facility] to patient  - advised patient to apply for health insurance and follow up in clinic      Provided patient with anticipatory guidance and return precautions. Treatment plan discussed with patient, all questions answered, and patient acknowledged understanding and verbal agreement.      Follow-up in: 1 month; or sooner PRN if acute concerns arise.      Case discussed with Dr. KHADRA Kahn MD, MPH  [facility] Family Medicine PGY-3        The following information is provided to all patients.  This information is to help you find resources for any of the problems found today that may be affecting your health:                Living healthy guide: www.[facility].[place].gov       Understanding Diabetes: www.diabetes.org       Eating healthy: www.cdc.gov/healthyweight      CDC home safety checklist: www.cdc.gov/steadi/patient.html      Agency on Aging: www.goea.[place].[facility]       Alcoholics anonymous (AA): www.aa.org      Physical Activity: www.[place]ett.nih.gov/cl0xxxm       Tobacco use: www.quitwithusla.org